# Patient Record
Sex: MALE | Race: BLACK OR AFRICAN AMERICAN | NOT HISPANIC OR LATINO | Employment: STUDENT | URBAN - METROPOLITAN AREA
[De-identification: names, ages, dates, MRNs, and addresses within clinical notes are randomized per-mention and may not be internally consistent; named-entity substitution may affect disease eponyms.]

---
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
## 2024-01-29 ENCOUNTER — APPOINTMENT (OUTPATIENT)
Dept: PRIMARY CARE | Facility: CLINIC | Age: 19
End: 2024-01-29
Payer: COMMERCIAL

## 2024-01-30 ENCOUNTER — HOSPITAL ENCOUNTER (EMERGENCY)
Facility: HOSPITAL | Age: 19
Discharge: HOME | End: 2024-01-30
Payer: COMMERCIAL

## 2024-01-30 VITALS
DIASTOLIC BLOOD PRESSURE: 87 MMHG | OXYGEN SATURATION: 98 % | HEIGHT: 76 IN | WEIGHT: 270 LBS | BODY MASS INDEX: 32.88 KG/M2 | RESPIRATION RATE: 18 BRPM | SYSTOLIC BLOOD PRESSURE: 136 MMHG | HEART RATE: 89 BPM | TEMPERATURE: 98 F

## 2024-01-30 DIAGNOSIS — R04.0 EPISTAXIS: ICD-10-CM

## 2024-01-30 DIAGNOSIS — J02.9 VIRAL PHARYNGITIS: Primary | ICD-10-CM

## 2024-01-30 PROCEDURE — 99282 EMERGENCY DEPT VISIT SF MDM: CPT | Mod: 27

## 2024-01-30 PROCEDURE — 2500000001 HC RX 250 WO HCPCS SELF ADMINISTERED DRUGS (ALT 637 FOR MEDICARE OP): Mod: SE | Performed by: PHYSICIAN ASSISTANT

## 2024-01-30 PROCEDURE — 99283 EMERGENCY DEPT VISIT LOW MDM: CPT

## 2024-01-30 PROCEDURE — 99284 EMERGENCY DEPT VISIT MOD MDM: CPT | Performed by: PHYSICIAN ASSISTANT

## 2024-01-30 RX ORDER — OXYMETAZOLINE HCL 0.05 %
2 SPRAY, NON-AEROSOL (ML) NASAL ONCE
Status: COMPLETED | OUTPATIENT
Start: 2024-01-30 | End: 2024-01-30

## 2024-01-30 RX ORDER — ACETAMINOPHEN 325 MG/1
650 TABLET ORAL EVERY 6 HOURS PRN
Qty: 30 TABLET | Refills: 0 | Status: SHIPPED | OUTPATIENT
Start: 2024-01-30

## 2024-01-30 RX ORDER — IBUPROFEN 600 MG/1
600 TABLET ORAL EVERY 6 HOURS PRN
Qty: 30 TABLET | Refills: 0 | Status: SHIPPED | OUTPATIENT
Start: 2024-01-30

## 2024-01-30 RX ORDER — IBUPROFEN 600 MG/1
600 TABLET ORAL ONCE
Status: DISCONTINUED | OUTPATIENT
Start: 2024-01-30 | End: 2024-01-30 | Stop reason: HOSPADM

## 2024-01-30 RX ADMIN — Medication 2 SPRAY: at 07:46

## 2024-01-30 ASSESSMENT — LIFESTYLE VARIABLES
HAVE YOU EVER FELT YOU SHOULD CUT DOWN ON YOUR DRINKING: NO
HAVE PEOPLE ANNOYED YOU BY CRITICIZING YOUR DRINKING: NO
EVER HAD A DRINK FIRST THING IN THE MORNING TO STEADY YOUR NERVES TO GET RID OF A HANGOVER: NO
EVER FELT BAD OR GUILTY ABOUT YOUR DRINKING: NO
REASON UNABLE TO ASSESS: YES

## 2024-01-30 ASSESSMENT — COLUMBIA-SUICIDE SEVERITY RATING SCALE - C-SSRS
2. HAVE YOU ACTUALLY HAD ANY THOUGHTS OF KILLING YOURSELF?: NO
1. IN THE PAST MONTH, HAVE YOU WISHED YOU WERE DEAD OR WISHED YOU COULD GO TO SLEEP AND NOT WAKE UP?: NO
6. HAVE YOU EVER DONE ANYTHING, STARTED TO DO ANYTHING, OR PREPARED TO DO ANYTHING TO END YOUR LIFE?: NO

## 2024-01-30 ASSESSMENT — PAIN DESCRIPTION - PROGRESSION: CLINICAL_PROGRESSION: NOT CHANGED

## 2024-01-30 ASSESSMENT — PAIN - FUNCTIONAL ASSESSMENT: PAIN_FUNCTIONAL_ASSESSMENT: 0-10

## 2024-01-30 NOTE — DISCHARGE INSTRUCTIONS
Take the Tylenol/ibuprofen for any sore throat.  If your nosebleed comes back spray the Afrin spray into that nostril and apply the clamp for 20 to 30 minutes, repeat.  If it persists for several hours return to the ED for evaluation.  You can use the Afrin for any nasal congestion every 4-6 hours for 2 days, do not use after 2 days.  Avoid blowing her nose hard for the next day.  If nosebleeds persist get a humidifier for your room as dryer causes nosebleeds as well.  Follow-up with your primary care doctor as needed.

## 2024-01-30 NOTE — Clinical Note
Rinku Singer was seen and treated in our emergency department on 1/30/2024.  He may return to work on 01/31/2024.       If you have any questions or concerns, please don't hesitate to call.      Alberto Smiley PA-C

## 2024-01-30 NOTE — ED PROVIDER NOTES
HPI:  18-year-old male otherwise healthy presents complaining of runny nose with some epistaxis.  States that he has had some runny nose for few days now and noticed started bleeding this morning.  States he has been blowing his nose quite a bit.  States the bleeding was mild.  Couple small clots.  Had some sore throat but not major.  Denies any fevers chills night sweats or rigors.  Denies any cough, headache, lightheadedness, dizziness, syncope, near syncope.  States he has not really applied pressure rather placed a tissue in his nose.    Physical Exam:   GEN: Vitals noted. NAD  EYES:  EOMs grossly intact, anicteric sclera  UMAIR: Mucosa moist.  No tonsillar erythema swelling or exudates, uvula midline, normal voice, tolerating secretions well without difficulty, no blood in the posterior oropharynx, postnasal drip is present  Nose: Congested bilaterally with mild bloody mucus with minimal weeping blood.  NECK: Supple.  CARD: RRR  PULMONARY: Moving air well. Clear all lung fields.  ABDOMEN: Soft, no guarding, no rigidity. Nontender. NABS  EXTREMITIES: Full ROM, no pitting edema,   SKIN: Intact, warm and dry  NEURO: Alert and oriented x 3, speech is clear, no obvious deficits noted.       ----------------------------------------------------------------------------------------------------------------------------    MDM:  18-year-old male presenting for epistaxis with runny nose.  On exam he is well-appearing able to complete.  Vital signs stable.  Did have him blow his nose a few times to get the mucus out and his nose was examined after that with mild weeping blood in the left anterior nare.  He is instructed on how to properly hold pressure for nosebleed and nose clamp is applied.  Will instill Afrin as well which will help both his rhinorrhea and his nosebleed.  On reevaluation after about 20 minutes of clamping after the Afrin his epistaxis appears to have stopped.  He is given instructions on stopping nosebleed  if it to or recur including Afrin with clamping.  Not to use the Afrin for more than 2 days.  To return to ED for persistent nosebleed.  He is given Tylenol ibuprofen for his viral pharyngitis/viral illness.  Told to follow-up with PCP as needed.  Return precautions reviewed.    No orders to display     Labs Reviewed - No data to display    ----------------------------------------------------------------------------------------------------------------------------    This note was dictated using a speech recognition program.  While an attempt was made at proof reading to minimize errors, minor errors in transcription may be present call for questions.     Alberto Smiley PA-C  01/30/24 8600